# Patient Record
Sex: FEMALE | Race: WHITE | Employment: OTHER | URBAN - METROPOLITAN AREA
[De-identification: names, ages, dates, MRNs, and addresses within clinical notes are randomized per-mention and may not be internally consistent; named-entity substitution may affect disease eponyms.]

---

## 2017-03-22 ENCOUNTER — OFFICE VISIT (OUTPATIENT)
Dept: HEMATOLOGY | Age: 62
End: 2017-03-22

## 2017-03-22 VITALS
OXYGEN SATURATION: 96 % | SYSTOLIC BLOOD PRESSURE: 142 MMHG | TEMPERATURE: 97 F | WEIGHT: 93 LBS | BODY MASS INDEX: 16.48 KG/M2 | DIASTOLIC BLOOD PRESSURE: 95 MMHG | HEART RATE: 81 BPM | HEIGHT: 63 IN

## 2017-03-22 DIAGNOSIS — R74.8 ELEVATED LIVER ENZYMES: ICD-10-CM

## 2017-03-22 DIAGNOSIS — R74.8 ELEVATED LIVER ENZYMES: Primary | ICD-10-CM

## 2017-03-22 NOTE — MR AVS SNAPSHOT
Visit Information Date & Time Provider Department Dept. Phone Encounter #  
 3/22/2017  2:00 PM Nicko Johnson MD [facility] [place] of [address] [id number] Follow-up Instructions Return in about 4 weeks (around 4/19/2017) for MLS. Follow-up and Disposition History Your Appointments 4/26/2017 12:00 PM  
Follow Up with Nicko Johnson MD  
[address] [facility] ([facility]) Appt Note: Elevated LFT's  
 [address] [facility], [place], [facility] [address] [facility]  
  
   
 [address] [facility] Upcoming Health Maintenance Date Due Pneumococcal 19-64 Medium Risk (1 of 1 - PPSV23) 11/17/1974 DTaP/Tdap/Td series (1 - Tdap) 11/17/1976 PAP AKA CERVICAL CYTOLOGY 11/17/1976 BREAST CANCER SCRN MAMMOGRAM 11/17/2005 FOBT Q 1 YEAR AGE 50-75 11/17/2005 ZOSTER VACCINE AGE 60> 11/17/2015 INFLUENZA AGE 9 TO ADULT 8/1/2016 Allergies as of 3/22/2017  Review Complete On: 3/22/2017 By: Conchis Skelton Severity Noted Reaction Type Reactions Codeine  01/31/2014    Itching, Nausea and Vomiting Patient states she is not allergic to medication, \"it just doesn't agree with me. \" Morphine  01/31/2014    Nausea and Vomiting Patient states she is not allergic to medication, \"it just doesn't agree with me. \"  PATIENT DENIES INTOLERANCE TO MORPHINE Vicodin [Hydrocodone-acetaminophen]  01/31/2014    Nausea and Vomiting Patient states she is not allergic to medication, \"it just doesn't agree with me. \" Current Immunizations  Never Reviewed No immunizations on file. Not reviewed this visit You Were Diagnosed With   
  
 Codes Comments Elevated liver enzymes    -  Primary ICD-10-CM: R74.8 ICD-9-CM: 790.5 Vitals BP Pulse Temp Height(growth percentile) Weight(growth percentile) SpO2 Eduar Reyes ) 142/95 81 97 °F (36.1 °C) (Tympanic) 5' 2.5\" (1.588 m) 93 lb (42.2 kg) 96% BMI OB Status Smoking Status 16.74 kg/m2 Postmenopausal Current Some Day Smoker Vitals History BMI and BSA Data Body Mass Index Body Surface Area 16.74 kg/m 2 1.36 m 2 Your Updated Medication List  
  
   
This list is accurate as of: 3/22/17 11:59 PM.  Always use your most recent med list.  
  
  
  
  
 Inés Nino Take  by mouth. We Performed the Following CBC WITH AUTOMATED DIFF [09113 CPT(R)] HEPATIC FUNCTION PANEL [76421 CPT(R)] METABOLIC PANEL, BASIC [16794 CPT(R)] Follow-up Instructions Return in about 4 weeks (around 4/19/2017) for MLS. To-Do List   
 03/22/2017 Lab:  ACTIN (SMOOTH MUSCLE) ANTIBODY   
  
 03/22/2017 Lab:  ALPHA-1-ANTITRYPSIN, TOTAL   
  
 03/22/2017 Lab:  ANTI-NEUTROPHIL CYTOPLASMIC AB   
  
 03/22/2017 Lab:  ANTINUCLEAR ANTIBODIES, IFA   
  
 03/22/2017 Lab:  FERRITIN   
  
 03/22/2017 Lab:  IRON PROFILE   
  
 03/22/2017 Lab:  MITOCHONDRIAL M2 AB   
  
 03/25/2017 Imaging:  US ABD LTD W ELASTOGRAPHY Introducing [facility]! Romayne Duster introduces [facility] patient portal. Now you can access parts of your medical record, email your doctor's office, and request medication refills online. 1. In your internet browser, go to [email or web address] 2. Click on the First Time User? Click Here link in the Sign In box. You will see the New Member Sign Up page. 3. Enter your [facility] Access Code exactly as it appears below. You will not need to use this code after youve completed the sign-up process. If you do not sign up before the expiration date, you must request a new code. · [facility] Access Code: [email or web address] Expires: 6/20/2017  2:16 PM 
 
4.  Enter the last four digits of your Social Security Number (xxxx) and Date of Birth (mm/dd/yyyy) as indicated and click Submit. You will be taken to the next sign-up page. 5. Create a MobilePaks ID. This will be your MobilePaks login ID and cannot be changed, so think of one that is secure and easy to remember. 6. Create a MobilePaks password. You can change your password at any time. 7. Enter your Password Reset Question and Answer. This can be used at a later time if you forget your password. 8. Enter your e-mail address. You will receive e-mail notification when new information is available in [address] 9. Click Sign Up. You can now view and download portions of your medical record. 10. Click the Download Summary menu link to download a portable copy of your medical information. If you have questions, please visit the Frequently Asked Questions section of the MobilePaks website. Remember, MobilePaks is NOT to be used for urgent needs. For medical emergencies, dial 911. Now available from your iPhone and Android! Please provide this summary of care documentation to your next provider. Your primary care clinician is listed as Larry Layne. If you have any questions after today's visit, please call [phone].

## 2017-03-22 NOTE — PROGRESS NOTES
[address] MD, ANKITA Mckeon PA-C Sylvia Romance, MD, MD Adele Escalante, NP     Rizwana Keane, NP        [address] [facility], [address] [address]  22.     [phone]     FAX: [address] [place] [address], [address] [place] [address], [address] [facility] - [address]     [phone]     FAX: [phone]         Patient Care Team:  Tomy Boothe as PCP - General (Physician Assistant)      Problem List  Date Reviewed: 4/27/2014          Codes Class Noted    Fixation hardware in foot ICD-10-CM: Z96.7  ICD-9-CM: V45.89  2/18/2016        Elevated alkaline phosphatase level ICD-10-CM: R74.8  ICD-9-CM: 790.5  4/27/2014        Peroneal tenosynovitis (Chronic) ICD-10-CM: M65.9  ICD-9-CM: 727.09  2/4/2014        Valgus foot deformity, acquired (Chronic) ICD-10-CM: M21.079  ICD-9-CM: 736.79  2/4/2014        HCV antibody positive ICD-10-CM: R76.8  ICD-9-CM: 795.79  1/28/2014    Overview Signed 4/27/2014 10:13 AM by Serena Apple MD     HCV RNA undetectable             Chronic back pain ICD-10-CM: M54.9, G89.29  ICD-9-CM: 724.5, 338.29  1/28/2014              Magdalene Courser returns to the [address] for management of Elevated alkaline phosphatase. The active problem list, all pertinent past medical history, medications, endoscopic studies, radiologic findings and laboratory findings related to the liver disorder were reviewed with the patient. The patient is anti-HCV positive but HCV RNA undetectable. Ultrasound of the liver was performed in 2/2014. This suggests that the liver is normal.      Assessment of fibrosis by liver or elastography has not been performed.         The most recent laboratory studies indicate that the liver transaminases are normal, alkaline phosphatase is elevated, tests of hepatic synthetic and metabolic function are normal, and the platelet count is normal.      The patient notes fatigue, arthralgias, myalgias. The patient has limitations in functional activities secondary to these symptoms. The patient has not experienced problems concentrating, swelling of the abdomen, swelling of the lower extremities, hematemesis, hematochezia. ALLERGIES  Allergies   Allergen Reactions    Codeine Itching and Nausea and Vomiting     Patient states she is not allergic to medication, \"it just doesn't agree with me. \"    Morphine Nausea and Vomiting     Patient states she is not allergic to medication, \"it just doesn't agree with me. \"  PATIENT DENIES INTOLERANCE TO MORPHINE    Vicodin [Hydrocodone-Acetaminophen] Nausea and Vomiting     Patient states she is not allergic to medication, \"it just doesn't agree with me. \"       Current Outpatient Prescriptions   Medication Sig    ALPRAZOLAM (XANAX PO) Take  by mouth. No current facility-administered medications for this visit. SYSTEM REVIEW NOT RELATED TO LIVER DISEASE OR REVIEWED ABOVE:  Constitution systems: Negative for fever, chills, weight gain, weight loss. Eyes: Negative for visual changes. ENT: Negative for sore throat, painful swallowing. Respiratory: Negative for cough, hemoptysis, SOB. Cardiology: Negative for chest pain, palpitations. GI:  Negative for constipation or diarrhea. : Negative for urinary frequency, dysuria, hematuria, nocturia. Skin: Negative for rash. Hematology: Negative for easy bruising, blood clots. Musculo-skelatal: Negative for back pain, muscle pain, weakness. Neurologic: Negative for headaches, dizziness, vertigo, memory problems not related to HE. Psychology: Negative for anxiety, depression. FAMILY HISTORY  The father  of dementia. The mother  of peripheral vascular disease. A sister had HCV and has been treated and achieved SVR.     SOCIAL HISTORY:  The patient has never been . The patient has no children. The patient had used tobacco in the past.  The patient stopped using tobacco in 6/2013. The patient consumes alcohol only rarely. The patient used to work as  and . She is no longer working. PHYSICAL EXAMINATION:  Visit Vitals    BP (!) 142/95    Pulse 81    Temp 97 °F (36.1 °C) (Tympanic)    Ht 5' 2.5\" (1.588 m)    Wt 93 lb (42.2 kg)    SpO2 96%    BMI 16.74 kg/m2     General: No acute distress. Eyes: Sclera anicteric. ENT: No oral lesions. Thyroid normal.  Nodes: No adenopathy. Skin: No spider angiomata. No jaundice. No palmar erythema. Respiratory: Lungs clear to auscultation. Cardiovascular: Regular heart rate. No murmurs. No JVD. Abdomen: Soft non-tender. Liver size normal to percussion/palpation. Spleen not palpable. No obvious ascites. Extremities: No edema. No muscle wasting. No gross arthritic changes. Neurologic: Alert and oriented. Cranial nerves grossly intact. No asterixsis.     LABORATORY STUDIES:  Liver [facility] Ref Rng 3/27/2014 1/28/2014 1/20/2014   WBC 3.4 - 10.8 x10E3/uL  4.9 6.6   ANC 1.4 - 7.0 x10E3/uL  3.2 4.4   HGB 11.1 - 15.9 g/dL  12.2 12.9    - 379 x10E3/uL  227 234   INR 0.8 - 1.2  1.1    AST 0 - 40 IU/L 15 18 15   ALT 0 - 32 IU/L 9 15 17   Alk Phos 39 - 117 IU/L 118 (H) 110 142 (H)   Bili, Total 0.0 - 1.2 mg/dL 0.2 0.2 0.3   Bili, Direct 0.00 - 0.40 mg/dL 0.05 0.07    Albumin 3.5 - 5.5 g/dL 4.0 4.1 3.7   BUN 6 - 24 mg/dL  9 17   Creat 0.57 - 1.00 mg/dL  0.81 1.08   Na 134 - 144 mmol/L  142 138   K 3.5 - 5.2 mmol/L  5.1 3.7   Cl 97 - 108 mmol/L  100 99 (L)   CO2 19 - 28 mmol/L  27 31   Glucose 65 - 99 mg/dL  82 105 (H)       SEROLOGIES:  Serologies Latest Ref Rng 1/28/2014   Hep A Ab, Total Negative Positive (A)   Hep B Surface Ag Negative Negative   Hep B Core Ab, Total Negative Positive (A)   Hep C Genotype  CANCELED   Ferritin 15 - 150 ng/mL 110   Iron % Saturation 15 - 55 % 21     1/2014. HCV RNA undetected. 2/2014. HCV RNA undetectable. 3/2014. HCV RNA undetectable. LIVER HISTOLOGY:  Not available or performed    ENDOSCOPIC PROCEDURES:  Not available or performed    RADIOLOGY:  2/2014. Ultrasound of liver. Normal appearing liver. No liver mass lesions. OTHER TESTING:  Not available or performed    ASSESSMENT AND PLAN:  Anti-HCV with undetectable HCV RNA. Serum ALP is intermittently elevated. Liver transaminases are normal.  Liver function is normal.  The platelet count is normal.      The patient was exposed to HCV but resolved the infection spontaneously. Will repeat ALP and monitor this a few times over the next year. If this now remains persistently normal then no further evaluation is needed. If ALP remains elevated will need MRCP, serology and possibly liver biopsy. The need to assess liver fibrosis was discussed. Sheer wave elastography can assess liver fibrosis and determine if a patient has advanced fibrosis or cirrhosis without the need for liver biopsy. Sheer wave elastography is now available at [facility]. This will be scheduled. Vaccination for viral hepatitis A and B is not needed. The patient has serologic evidence of prior exposure or vaccination with immunity. [facility] 87 in 6 months.     Kris Meza MD  [facility] [place] of [address], [address] [facility], [address]  [phone]

## 2017-03-23 ENCOUNTER — HOSPITAL ENCOUNTER (OUTPATIENT)
Dept: LAB | Age: 62
Discharge: HOME | End: 2017-03-23

## 2017-03-28 ENCOUNTER — HOSPITAL ENCOUNTER (OUTPATIENT)
Dept: LAB | Age: 62
Discharge: HOME | End: 2017-03-28

## 2017-03-28 ENCOUNTER — HOSPITAL ENCOUNTER (OUTPATIENT)
Dept: GENERAL RADIOLOGY | Age: 62
Discharge: HOME | End: 2017-03-28
Payer: MEDICAID

## 2017-03-28 DIAGNOSIS — R63.4 ABNORMAL WEIGHT LOSS: ICD-10-CM

## 2017-03-28 PROCEDURE — 71020 XR CHEST PA LAT: CPT

## 2017-03-28 PROCEDURE — 99001 SPECIMEN HANDLING PT-LAB: CPT | Performed by: INTERNAL MEDICINE

## 2017-03-30 LAB
A1AT SERPL-MCNC: 214 MG/DL (ref 90–200)
ACTIN IGG SERPL-ACNC: 9 UNITS (ref 0–19)
ANA TITR SER IF: NEGATIVE {TITER}
C-ANCA TITR SER IF: NORMAL TITER
FERRITIN SERPL-MCNC: 84 NG/ML (ref 15–150)
IRON SATN MFR SERPL: 15 % (ref 15–55)
IRON SERPL-MCNC: 48 UG/DL (ref 27–139)
MITOCHONDRIA M2 IGG SER-ACNC: 3.7 UNITS (ref 0–20)
P-ANCA ATYPICAL TITR SER IF: NORMAL TITER
P-ANCA TITR SER IF: NORMAL TITER
TIBC SERPL-MCNC: 313 UG/DL (ref 250–450)
UIBC SERPL-MCNC: 265 UG/DL (ref 118–369)

## 2017-07-17 ENCOUNTER — HOSPITAL ENCOUNTER (EMERGENCY)
Age: 62
Discharge: HOME | End: 2017-07-17
Attending: EMERGENCY MEDICINE
Payer: MEDICAID

## 2017-07-17 VITALS
HEIGHT: 63 IN | BODY MASS INDEX: 18.07 KG/M2 | DIASTOLIC BLOOD PRESSURE: 89 MMHG | HEART RATE: 81 BPM | SYSTOLIC BLOOD PRESSURE: 165 MMHG | WEIGHT: 102 LBS | OXYGEN SATURATION: 100 % | RESPIRATION RATE: 20 BRPM | TEMPERATURE: 98.2 F

## 2017-07-17 DIAGNOSIS — H65.93 MIDDLE EAR EFFUSION, BILATERAL: Primary | ICD-10-CM

## 2017-07-17 PROCEDURE — 99282 EMERGENCY DEPT VISIT SF MDM: CPT

## 2017-07-17 RX ORDER — LORATADINE 10 MG/1
10 TABLET ORAL DAILY
Qty: 20 TAB | Refills: 0 | Status: SHIPPED | OUTPATIENT
Start: 2017-07-17

## 2017-07-17 RX ORDER — IBUPROFEN 600 MG/1
600 TABLET ORAL
Qty: 20 TAB | Refills: 0 | Status: SHIPPED | OUTPATIENT
Start: 2017-07-17

## 2017-07-17 RX ORDER — FLUTICASONE PROPIONATE 50 MCG
2 SPRAY, SUSPENSION (ML) NASAL DAILY
Qty: 1 BOTTLE | Refills: 0 | Status: SHIPPED | OUTPATIENT
Start: 2017-07-17

## 2017-07-17 NOTE — ED TRIAGE NOTES
Patient with complaints of having swimmers ear in bilateral ears. Patient also states that she is here for a refill of her Xanax. Patient does not see her PCP until the 3rd of August. Upon checking in patient stated that it was too cold to wait in the lobby and waited in the car.

## 2017-07-17 NOTE — ED PROVIDER NOTES
HPI Comments: 10:26 AM     Marie Santillan is a 64 y.o. female presenting to the ED C/O bilateral ear pain. Pt voices concern for swimmer's ear which was diagnosed by her PCP 1 month ago. She was rx'd with antibiotics which she has taken with some relief. Associated sxs include sensitive hearing and congestion. Pt is also requesting a refill for her Xanax; her next appointment with her PCP is in 2 weeks. Reports cigarette use cessation 5 months ago. Denies hx of HTN. Pt denies fever, ear drainage, sore throat, and any other symptoms or complaints. Written by MIRIAM Grimaldo, as dictated by Brenda Harman PA-C      Patient is a 64 y.o. female presenting with ear pain. The history is provided by the patient. No  was used. Ear Pain   This is a new problem. The current episode started more than 1 week ago (1 month ago). The problem occurs constantly. Treatments tried: antibiotics. The treatment provided moderate relief. Past Medical History:   Diagnosis Date    Ankle fracture 2008    Anxiety     Asthma     Copd?  Chronic pain     on methadone and heavy narcotics    Hepatitis     c    High cholesterol     Lumbar back pain        Past Surgical History:   Procedure Laterality Date    HX ORTHOPAEDIC  1977    back surgery    HX ORTHOPAEDIC  2012    FOOT SX         History reviewed. No pertinent family history. Social History     Social History    Marital status: SINGLE     Spouse name: N/A    Number of children: N/A    Years of education: N/A     Occupational History    Not on file.      Social History Main Topics    Smoking status: Current Some Day Smoker    Smokeless tobacco: Never Used    Alcohol use 4.2 oz/week     7 Glasses of wine per week      Comment: occasionally    Drug use: No    Sexual activity: No     Other Topics Concern    Not on file     Social History Narrative         ALLERGIES: Codeine; Morphine; and Vicodin [hydrocodone-acetaminophen]    Review of Systems   Constitutional: Negative for fever. HENT: Positive for congestion and ear pain (bilateral). Negative for ear discharge and sore throat. All other systems reviewed and are negative. Vitals:    07/17/17 1019   BP: 165/89   Pulse: 81   Resp: 20   Temp: 98.2 °F (36.8 °C)   SpO2: 100%   Weight: 46.3 kg (102 lb)   Height: 5' 3\" (1.6 m)            Physical Exam   Constitutional: She is oriented to person, place, and time. She appears well-developed and well-nourished. No distress. Alert, well appearing, non toxic, speaking in full sentences without difficulty    HENT:   Head: Normocephalic and atraumatic. Right Ear: External ear and ear canal normal. No drainage, swelling or tenderness. Tympanic membrane is not perforated, not erythematous, not retracted and not bulging. A middle ear effusion is present. Left Ear: External ear and ear canal normal. No drainage, swelling or tenderness. Tympanic membrane is not perforated, not erythematous, not retracted and not bulging. A middle ear effusion is present. Nose: Nose normal. No mucosal edema or rhinorrhea. Right sinus exhibits no maxillary sinus tenderness and no frontal sinus tenderness. Left sinus exhibits no maxillary sinus tenderness and no frontal sinus tenderness. Mouth/Throat: Uvula is midline, oropharynx is clear and moist and mucous membranes are normal. Mucous membranes are not dry. No uvula swelling. No oropharyngeal exudate, posterior oropharyngeal edema, posterior oropharyngeal erythema or tonsillar abscesses. Neck: Normal range of motion. Neck supple. Cardiovascular: Normal rate, regular rhythm, normal heart sounds and intact distal pulses. No murmur heard. Pulmonary/Chest: Effort normal and breath sounds normal. No respiratory distress. She has no wheezes. She has no rales. Lymphadenopathy:     She has no cervical adenopathy.    Neurological: She is alert and oriented to person, place, and time. Skin: Skin is warm and dry. No rash noted. Psychiatric: She has a normal mood and affect. Judgment normal.   Nursing note and vitals reviewed. RESULTS:    PULSE OXIMETRY NOTE:  Pulse-ox is 100% on room air  Interpretation: normal       No orders to display        Labs Reviewed - No data to display    No results found for this or any previous visit (from the past 12 hour(s)). MDM  Number of Diagnoses or Management Options  Medication refill:   Middle ear effusion, bilateral:     ED Course     MEDICATIONS GIVEN:  Medications - No data to display     Procedures    PROGRESS NOTE:  10:26 AM  Initial assessment performed. Written by Frederic De Los Santos ED Scribe, as dictated by Delmar Antonio PA-C    DISCHARGE NOTE:  10:36 AM   Ana Fischer's  results have been reviewed with her. She has been counseled regarding her diagnosis, treatment, and plan. She verbally conveys understanding and agreement of the signs, symptoms, diagnosis, treatment and prognosis and additionally agrees to follow up as discussed. She also agrees with the care-plan and conveys that all of her questions have been answered. I have also provided discharge instructions for her that include: educational information regarding their diagnosis and treatment, and list of reasons why they would want to return to the ED prior to their follow-up appointment, should her condition change. The patient and/or family has been provided with education for proper Emergency Department utilization. CLINICAL IMPRESSION:    1.  Middle ear effusion, bilateral        PLAN: DISCHARGE HOME    Follow-up Information     Follow up With Details Comments [address] [place], [place] Go in 2 weeks For your appointment as scheduled, and , As needed Romeo  [phone]      [facility] EMERGENCY DEPT  As needed, If symptoms worsen [address] [facility] [address] [phone]          Discharge Medication List as of 7/17/2017 10:39 AM      START taking these medications    Details   fluticasone (FLONASE) 50 mcg/actuation nasal spray 2 Sprays by Both Nostrils route daily. , Normal, Disp-1 Bottle, R-0      loratadine (CLARITIN) 10 mg tablet Take 1 Tab by mouth daily. , Normal, Disp-20 Tab, R-0      ibuprofen (MOTRIN) 600 mg tablet Take 1 Tab by mouth every six (6) hours as needed for Pain., Normal, Disp-20 Tab, R-0         CONTINUE these medications which have NOT CHANGED    Details   ALPRAZOLAM (XANAX PO) Take  by mouth., Historical Med             ATTESTATIONS:  This note is prepared by Mirlande Pleitez, acting as Scribe for Juan Carlos Stoll PA-C. Juan Carlos Stoll PA-C: The scribe's documentation has been prepared under my direction and personally reviewed by me in its entirety. I confirm that the note above accurately reflects all work, treatment, procedures, and medical decision making performed by me.

## 2017-07-17 NOTE — DISCHARGE INSTRUCTIONS
Middle Ear Fluid: Care Instructions  Your Care Instructions    Fluid often builds up inside the ear during a cold or allergies. Usually the fluid drains away, but sometimes a small tube in the ear, called the eustachian tube, stays blocked for months. Symptoms of fluid buildup may include:  · Popping, ringing, or a feeling of fullness or pressure in the ear. · Trouble hearing. · Balance problems and dizziness. In most cases, you can treat yourself at home. Follow-up care is a key part of your treatment and safety. Be sure to make and go to all appointments, and call your doctor if you are having problems. It's also a good idea to know your test results and keep a list of the medicines you take. How can you care for yourself at home? · In most cases, the fluid clears up within a few months without treatment. You may need more tests if the fluid does not clear up after 3 months. · If your doctor prescribed antibiotics, take them as directed. Do not stop taking them just because you feel better. You need to take the full course of antibiotics. When should you call for help? Call your doctor now or seek immediate medical care if:  · You have symptoms of infection, such as:  ¨ Increased pain, swelling, warmth, or redness. ¨ Pus draining from the area. ¨ A fever. Watch closely for changes in your health, and be sure to contact your doctor if:  · You notice changes in hearing. · You do not get better as expected. Where can you learn more? Go to http://olivia-sheila.info/. Enter F224 in the search box to learn more about \"Middle Ear Fluid: Care Instructions. \"  Current as of: July 29, 2016  Content Version: 11.3  © 4795-5477 [employer]. Care instructions adapted under license by [employer] (which disclaims liability or warranty for this information).  If you have questions about a medical condition or this instruction, always ask your healthcare professional. [employer] disclaims any warranty or liability for your use of this information.

## 2018-11-26 ENCOUNTER — OFFICE VISIT (OUTPATIENT)
Dept: HEMATOLOGY | Age: 63
End: 2018-11-26

## 2018-11-26 VITALS
TEMPERATURE: 97 F | HEIGHT: 63 IN | WEIGHT: 104.13 LBS | SYSTOLIC BLOOD PRESSURE: 114 MMHG | DIASTOLIC BLOOD PRESSURE: 69 MMHG | HEART RATE: 78 BPM | OXYGEN SATURATION: 93 % | BODY MASS INDEX: 18.45 KG/M2

## 2018-11-26 DIAGNOSIS — R74.8 ELEVATED ALKALINE PHOSPHATASE LEVEL: Primary | ICD-10-CM

## 2018-11-26 DIAGNOSIS — R74.8 ELEVATED ALKALINE PHOSPHATASE LEVEL: ICD-10-CM

## 2018-11-26 NOTE — PROGRESS NOTES
245 [facility] [address], [place], [address], [place], [place]       Dick Resendiz, SHARMILA Arndt, [facility]   ANKITA Tate NP [facility] 136    at [address], [facility] [place] Stevie Thibodeaux  22.    [phone]    FAX: [address] [place] [address]    at [address], [address]    [phone] 3531 [facility] Street: [phone]       Patient Care Team:  Tomy Snow as PCP - General (Physician Assistant)      Problem List  Date Reviewed: 3/25/2017          Codes Class Noted    Fixation hardware in foot ICD-10-CM: Z96.7  ICD-9-CM: V45.89  2/18/2016        Elevated alkaline phosphatase level ICD-10-CM: R74.8  ICD-9-CM: 790.5  4/27/2014        Peroneal tenosynovitis (Chronic) ICD-10-CM: M65.9  ICD-9-CM: 727.09  2/4/2014        Valgus foot deformity, acquired (Chronic) ICD-10-CM: M21.079  ICD-9-CM: 736.79  2/4/2014        HCV antibody positive ICD-10-CM: R76.8  ICD-9-CM: 795.79  1/28/2014    Overview Signed 4/27/2014 10:13 AM by Rox Barrera MD     HCV RNA undetectable             Chronic back pain ICD-10-CM: M54.9, G89.29  ICD-9-CM: 724.5, 338.29  1/28/2014              [facility]arn[facility]ab[facility] of Elevated alkaline phosphatase. The active problem list, all pertinent past medical history, medications, endoscopic studies, radiologic findings and laboratory findings related to the liver disorder were reviewed with the patient. The patient is anti-HCV positive but HCV RNA undetectable. Ultrasound of the liver was performed in 2/2014. This suggests that the liver is normal.      Assessment of fibrosis by liver or elastography has not been performed.         The patient notes fatigue, arthralgias, myalgias. The patient has limitations in functional activities secondary to these symptoms. The patient has not experienced problems concentrating, swelling of the abdomen, swelling of the lower extremities, hematemesis, hematochezia. ASSESSMENT AND PLAN:  Elevated liver enzymes  Persistent elevation in alkaline phosphatase of unclear etiology at this time. Liver transaminases are normal.  ALP is elevated. Liver function is normal.  The platelet count is normal.    Serologic testing for causes of chronic liver disease were negative     Will perform imaging of the liver with ultrasound. The need to assess liver fibrosis was discussed. Sheer wave elastography can assess liver fibrosis and determine if a patient has advanced fibrosis or cirrhosis without the need for liver biopsy. Sheer wave elastography is now available at [facility]. This will be scheduled. If elastrography suggests advanced fibrosis then a liver biopsy should be considered. Elastography can be repeated annually to assess for fibrosis progression and need for treatment of the liver disorder. The most likely cause for persistent elevation in ALP is medications. However sicne the ALP is stable and no other liver chemistries are abnormal no changes to medications needs to be made at this time. Treatment of other medical problems in patients with chronic liver disease  There are no contraindications for the patient to take any medications that are necessary for treatment of other medical issues. The patient does not consume alcohol daily. Normal doses of acetaminophen can be used for pain as needed. Normal doses of acetaminophen as recommended on the label are not hepatotoxic, even in patients with cirrhosis.     Counseling for alcohol in patients with chronic liver disease  The patient was counseled regarding alcohol consumption and the effect of alcohol on chronic liver disease. The patient does not consume any significant amount of alcohol. Osteoporosis  The patient has osteoporosis by DEXA scan from 2014. She is not taking a bisphosphonate. The patient should be started on a bisphosphonate. The elevation in ALP is not a contraindication to this. Vaccinations   Vaccination for viral hepatitis A and B is not needed. The patient has serologic evidence of prior exposure or vaccination with immunity. Routine vaccinations against other bacterial and viral agents can be performed as indicated. Annual flu vaccination should be administered if indicated. ALLERGIES  Allergies   Allergen Reactions    Codeine Itching and Nausea and Vomiting     Patient states she is not allergic to medication, \"it just doesn't agree with me. \"    Morphine Nausea and Vomiting     Patient states she is not allergic to medication, \"it just doesn't agree with me. \"  PATIENT DENIES INTOLERANCE TO MORPHINE    Vicodin [Hydrocodone-Acetaminophen] Nausea and Vomiting     Patient states she is not allergic to medication, \"it just doesn't agree with me. \"       Current Outpatient Medications   Medication Sig    fluticasone (FLONASE) 50 mcg/actuation nasal spray 2 Sprays by Both Nostrils route daily.  loratadine (CLARITIN) 10 mg tablet Take 1 Tab by mouth daily.  ibuprofen (MOTRIN) 600 mg tablet Take 1 Tab by mouth every six (6) hours as needed for Pain.  ALPRAZOLAM (XANAX PO) Take  by mouth. No current facility-administered medications for this visit. SYSTEM REVIEW NOT RELATED TO LIVER DISEASE OR REVIEWED ABOVE:  Constitution systems: Negative for fever, chills, weight gain, weight loss. Eyes: Negative for visual changes. ENT: Negative for sore throat, painful swallowing. Respiratory: Negative for cough, hemoptysis, SOB. Cardiology: Negative for chest pain, palpitations. GI:  Negative for constipation or diarrhea.   : Negative for urinary frequency, dysuria, hematuria, nocturia. Skin: Negative for rash. Hematology: Negative for easy bruising, blood clots. Musculo-skelatal: Negative for back pain, muscle pain, weakness. Neurologic: Negative for headaches, dizziness, vertigo, memory problems not related to HE. Psychology: Negative for anxiety, depression. FAMILY HISTORY  The father  of dementia. The mother  of peripheral vascular disease. A sister had HCV and has been treated and achieved SVR. SOCIAL HISTORY:  The patient has never been . The patient has no children. The patient had used tobacco in the past.  The patient stopped using tobacco in 2013. The patient consumes alcohol only rarely. The patient used to work as  and . She is no longer working. PHYSICAL EXAMINATION:  Visit Vitals  /69   Pulse 78   Temp 97 °F (36.1 °C) (Tympanic)   Ht 5' 3\" (1.6 m)   Wt 104 lb 2 oz (47.2 kg)   SpO2 93%   BMI 18.44 kg/m²     General: No acute distress. Eyes: Sclera anicteric. ENT: No oral lesions. Thyroid normal.  Nodes: No adenopathy. Skin: No spider angiomata. No jaundice. No palmar erythema. Respiratory: Lungs clear to auscultation. Cardiovascular: Regular heart rate. No murmurs. No JVD. Abdomen: Soft non-tender. Liver size normal to percussion/palpation. Spleen not palpable. No obvious ascites. Extremities: No edema. No muscle wasting. No gross arthritic changes. Neurologic: Alert and oriented. Cranial nerves grossly intact. No asterixsis.     LABORATORY STUDIES:  [facility] Ref Rng 3/27/2014 2014 2014   WBC 3.4 - 10.8 x10E3/uL  4.9 6.6   ANC 1.4 - 7.0 x10E3/uL  3.2 4.4   HGB 11.1 - 15.9 g/dL  12.2 12.9    - 379 x10E3/uL  227 234   INR 0.8 - 1.2  1.1    AST 0 - 40 IU/L 15 18 15   ALT 0 - 32 IU/L 9 15 17   Alk Phos 39 - 117 IU/L 118 (H) 110 142 (H)   Bili, Total 0.0 - 1.2 mg/dL 0.2 0.2 0.3   Bili, Direct 0.00 - 0.40 mg/dL 0.05 0.07    Albumin 3.5 - 5.5 g/dL 4.0 4.1 3.7   BUN 6 - 24 mg/dL  9 17   Creat 0.57 - 1.00 mg/dL  0.81 1.08   Na 134 - 144 mmol/L  142 138   K 3.5 - 5.2 mmol/L  5.1 3.7   Cl 97 - 108 mmol/L  100 99 (L)   CO2 19 - 28 mmol/L  27 31   Glucose 65 - 99 mg/dL  82 105 (H)       SEROLOGIES:  Serologies Latest Ref Rng 2014   Hep A Ab, Total Negative Positive (A)   Hep B Surface Ag Negative Negative   Hep B Core Ab, Total Negative Positive (A)   Hep C Genotype  CANCELED   Ferritin 15 - 150 ng/mL 110   Iron % Saturation 15 - 55 % 21     2014. HCV RNA undetected. 2014. HCV RNA undetectable. 3/2014. HCV RNA undetectable. Serologies Latest Ref Rng & Units 3/28/2017   Ferritin 15 - 150 ng/mL 84   Iron % Saturation 15 - 55 % 15   MARY, IFA  Negative   C-ANCA Neg:<1:20 titer <1:20   P-ANCA Neg:<1:20 titer <1:20   ANCA Neg:<1:20 titer <1:20   ASMCA 0 - 19 Units 9   M2 Ab 0.0 - 20.0 Units 3.7   Alpha-1 antitrypsin level 90 - 200 mg/dL 214 (H)     LIVER HISTOLOGY:  Not available or performed    ENDOSCOPIC PROCEDURES:  Not available or performed    RADIOLOGY:  2014. Ultrasound of liver. Normal appearing liver. No liver mass lesions. OTHER TESTIN2014. DEXA bone scan. Osteoporosis    FOLLOW-UP:  All of the issues listed above in the Assessment and Plan were discussed with the patient. All questions were answered. The patient expressed a clear understanding of the above. 1901 [facility] 87 in 4 weeks for elastography to review all data and determine the treatment plan.       Betty Norris [place]  [address]  4 [facility], [address] [place] [address] Cheyenne Garcia, [address]  12 [facility]

## 2018-12-21 ENCOUNTER — HOSPITAL ENCOUNTER (OUTPATIENT)
Dept: ULTRASOUND IMAGING | Age: 63
Discharge: HOME | End: 2018-12-21
Attending: INTERNAL MEDICINE
Payer: MEDICAID

## 2018-12-21 DIAGNOSIS — R74.8 ELEVATED ALKALINE PHOSPHATASE LEVEL: ICD-10-CM

## 2018-12-21 PROCEDURE — 0346T US ABD LTD W ELASTOGRAPHY: CPT

## 2018-12-26 ENCOUNTER — HOSPITAL ENCOUNTER (OUTPATIENT)
Dept: LAB | Age: 63
Discharge: HOME | End: 2018-12-26

## 2018-12-26 ENCOUNTER — OFFICE VISIT (OUTPATIENT)
Dept: HEMATOLOGY | Age: 63
End: 2018-12-26

## 2018-12-26 VITALS
HEIGHT: 63 IN | BODY MASS INDEX: 18.36 KG/M2 | SYSTOLIC BLOOD PRESSURE: 118 MMHG | DIASTOLIC BLOOD PRESSURE: 93 MMHG | RESPIRATION RATE: 18 BRPM | OXYGEN SATURATION: 97 % | WEIGHT: 103.6 LBS | HEART RATE: 87 BPM | TEMPERATURE: 96.8 F

## 2018-12-26 DIAGNOSIS — R74.8 ELEVATED ALKALINE PHOSPHATASE LEVEL: Primary | ICD-10-CM

## 2018-12-26 LAB — XX-LABCORP SPECIMEN COL,LCBCF: NORMAL

## 2018-12-26 PROCEDURE — 99001 SPECIMEN HANDLING PT-LAB: CPT

## 2018-12-26 NOTE — PROGRESS NOTES
245 [facility] [address], [place], [address] [address], [place], [place]       Dominic Noel, ANKITA Coyne, SHARMILA Yan, [facility]   Monroe Lee, ANKITA Worrell, ANKITA Garcia [facility] 136    at [address], [address] Stevie Thibodeaux  22.    [phone]    FAX: [address] [place] [address]    at [address], [id number] [place] [address], [address]    [phone]    FAX: [phone]     Patient Care Team:  Benito Carter MD as PCP - General (Family Practice)    Problem List  Date Reviewed: 1/2/2019          Codes Class Noted    Fixation hardware in foot ICD-10-CM: Z96.7  ICD-9-CM: V45.89  2/18/2016        Elevated alkaline phosphatase level ICD-10-CM: R74.8  ICD-9-CM: 790.5  4/27/2014        Peroneal tenosynovitis (Chronic) ICD-10-CM: M65.9  ICD-9-CM: 727.09  2/4/2014        Valgus foot deformity, acquired (Chronic) ICD-10-CM: M21.079  ICD-9-CM: 736.79  2/4/2014        HCV antibody positive ICD-10-CM: R76.8  ICD-9-CM: 795.79  1/28/2014    Overview Signed 4/27/2014 10:13 AM by Ling Ramirez MD     HCV RNA undetectable             Chronic back pain ICD-10-CM: M54.9, G89.29  ICD-9-CM: 724.5, 338.29  1/28/2014              Dawn[facility]root [facility] of Elevated alkaline phosphatase. The active problem list, all pertinent past medical history, medications, endoscopic studies, radiologic findings and laboratory findings related to the liver disorder were reviewed with the patient. The patient is anti-HCV positive but HCV RNA undetectable. Ultrasound of the liver was performed in 12/2018. This suggests chronic liver disease/cirrhosis. Assessment of liver fibrosis was performed with sheer wave elastography at THE [facility] in 12/2018.  The result was E Mean: 6.52 kPa which correlates with normal to mild fibrosis. The patient notes fatigue, arthralgias, myalgias. The patient has not experienced problems concentrating, swelling of the abdomen, swelling of the lower extremities, hematemesis, hematochezia. The patient has limitations in functional activities secondary to these symptoms. ASSESSMENT AND PLAN:  Elevated liver enzymes  Persistent elevation in alkaline phosphatase of unclear etiology at this time. Liver transaminases are normal.  ALP is elevated. Liver function is normal.  The platelet count is normal.    Serologic testing for causes of chronic liver disease were negative     Will perform imaging of the liver with ultrasound. The need to assess liver fibrosis was discussed. Sheer wave elastography can assess liver fibrosis and determine if a patient has advanced fibrosis or cirrhosis without the need for liver biopsy. Sheer wave elastography is now available at [facility]. This will be scheduled. If elastrography suggests advanced fibrosis then a liver biopsy should be considered. Elastography can be repeated annually to assess for fibrosis progression and need for treatment of the liver disorder. The most likely cause for persistent elevation in ALP is medications. However sicne the ALP is stable and no other liver chemistries are abnormal no changes to medications needs to be made at this time. Treatment of other medical problems in patients with chronic liver disease  There are no contraindications for the patient to take any medications that are necessary for treatment of other medical issues. The patient does not consume alcohol daily. Normal doses of acetaminophen can be used for pain as needed. Normal doses of acetaminophen as recommended on the label are not hepatotoxic, even in patients with cirrhosis.     Counseling for alcohol in patients with chronic liver disease  The patient was counseled regarding alcohol consumption and the effect of alcohol on chronic liver disease. The patient does not consume any significant amount of alcohol. Osteoporosis  The patient has osteoporosis by DEXA scan from 2014. She is not taking a bisphosphonate. The patient should be started on a bisphosphonate. The elevation in ALP is not a contraindication to this. Vaccinations   Vaccination for viral hepatitis A and B is not needed. The patient has serologic evidence of prior exposure or vaccination with immunity. Routine vaccinations against other bacterial and viral agents can be performed as indicated. Annual flu vaccination should be administered if indicated. ALLERGIES  Allergies   Allergen Reactions    Codeine Itching and Nausea and Vomiting     Patient states she is not allergic to medication, \"it just doesn't agree with me. \"    Morphine Nausea and Vomiting     Patient states she is not allergic to medication, \"it just doesn't agree with me. \"  PATIENT DENIES INTOLERANCE TO MORPHINE    Vicodin [Hydrocodone-Acetaminophen] Nausea and Vomiting     Patient states she is not allergic to medication, \"it just doesn't agree with me. \"       Current Outpatient Medications   Medication Sig    levothyroxine sodium (SYNTHROID PO) Take  by mouth.  ALPRAZOLAM (XANAX PO) Take  by mouth.  fluticasone (FLONASE) 50 mcg/actuation nasal spray 2 Sprays by Both Nostrils route daily.  loratadine (CLARITIN) 10 mg tablet Take 1 Tab by mouth daily.  ibuprofen (MOTRIN) 600 mg tablet Take 1 Tab by mouth every six (6) hours as needed for Pain. No current facility-administered medications for this visit. SYSTEM REVIEW NOT RELATED TO LIVER DISEASE OR REVIEWED ABOVE:  Constitution systems: Negative for fever, chills, weight gain, weight loss. Eyes: Negative for visual changes. ENT: Negative for sore throat, painful swallowing. Respiratory: Negative for cough, hemoptysis, SOB.    Cardiology: Negative for chest pain, palpitations. GI:  Negative for constipation or diarrhea. : Negative for urinary frequency, dysuria, hematuria, nocturia. Skin: Negative for rash. Hematology: Negative for easy bruising, blood clots. Musculo-skelatal: Negative for back pain, muscle pain, weakness. Neurologic: Negative for headaches, dizziness, vertigo, memory problems not related to HE. Psychology: Negative for anxiety, depression. FAMILY HISTORY  The father  of dementia. The mother  of peripheral vascular disease. A sister had HCV and has been treated and achieved SVR. SOCIAL HISTORY:  The patient has never been . The patient has no children. The patient had used tobacco in the past.  The patient stopped using tobacco in 2013. The patient consumes alcohol only rarely. The patient used to work as  and . She is no longer working. PHYSICAL EXAMINATION:  Visit Vitals  BP (!) 118/93 (BP 1 Location: Left arm, BP Patient Position: Sitting)   Pulse 87   Temp 96.8 °F (36 °C) (Tympanic)   Resp 18   Ht 5' 3\" (1.6 m)   Wt 103 lb 9.6 oz (47 kg)   SpO2 97%   BMI 18.35 kg/m²     General: No acute distress. Eyes: Sclera anicteric. ENT: No oral lesions. Thyroid normal.  Nodes: No adenopathy. Skin: No spider angiomata. No jaundice. No palmar erythema. Respiratory: Lungs clear to auscultation. Cardiovascular: Regular heart rate. No murmurs. No JVD. Abdomen: Soft non-tender. Liver size normal to percussion/palpation. Spleen not palpable. No obvious ascites. Extremities: No edema. No muscle wasting. No gross arthritic changes. Neurologic: Alert and oriented. Cranial nerves grossly intact. No asterixsis.     LABORATORY STUDIES:  Liver [place] of [address] Units 2018   WBC 3.4 - 10.8 x10E3/uL 6.4   ANC 1.4 - 7.0 x10E3/uL 4.6   HGB 11.1 - 15.9 g/dL 14.1    - 379 x10E3/uL 304   INR 0.8 - 1.2 1.0   AST 0 - 40 IU/L 23   ALT 0 - 32 IU/L 16   Alk Phos 39 - 117 IU/L 147 (H)   Bili, Total 0.0 - 1.2 mg/dL 0.3   Bili, Direct 0.00 - 0.40 mg/dL 0.08   Albumin 3.6 - 4.8 g/dL 4.7   BUN 8 - 27 mg/dL 9   Creat 0.57 - 1.00 mg/dL 0.90   Na 134 - 144 mmol/L 142   K 3.5 - 5.2 mmol/L 5.8 (H)   Cl 96 - 106 mmol/L 101   CO2 20 - 29 mmol/L 27   Glucose 65 - 99 mg/dL 94       SEROLOGIES:  Serologies Latest Ref Rng 2014   Hep A Ab, Total Negative Positive (A)   Hep B Surface Ag Negative Negative   Hep B Core Ab, Total Negative Positive (A)   Hep C Genotype  CANCELED   Ferritin 15 - 150 ng/mL 110   Iron % Saturation 15 - 55 % 21     2014. HCV RNA undetected. 2014. HCV RNA undetectable. 3/2014. HCV RNA undetectable. Serologies Latest Ref Rng & Units 3/28/2017   Ferritin 15 - 150 ng/mL 84   Iron % Saturation 15 - 55 % 15   MARY, IFA  Negative   C-ANCA Neg:<1:20 titer <1:20   P-ANCA Neg:<1:20 titer <1:20   ANCA Neg:<1:20 titer <1:20   ASMCA 0 - 19 Units 9   M2 Ab 0.0 - 20.0 Units 3.7   Alpha-1 antitrypsin level 90 - 200 mg/dL 214 (H)     LIVER HISTOLOGY:  2018. Sheer wave elastography performed at [facility]. EkPa was E Range: 4.76-10.10 kPa, E Mean: 6.52 kPa, E Median: 6.50 kPa, E Std: 1.63 kPa. The results suggested a fibrosis level of F1.    ENDOSCOPIC PROCEDURES:  Not available or performed    RADIOLOGY:  2014. Ultrasound of liver. Normal appearing liver. No liver mass lesions. 2018. Ultrasound of liver. Echogenic consistent with chronic liver disease. No liver mass lesions. No dilated bile ducts. No ascites. OTHER TESTIN2014. DEXA bone scan. Osteoporosis    FOLLOW-UP:  All of the issues listed above in the Assessment and Plan were discussed with the patient. All questions were answered. The patient expressed a clear understanding of the above. 1901 [facility] 87 in 6 months for routine monitoring.      Red Almendarez, ROMAINE-BC  [address] Liver [facility]  [address] [facility], [address]  98 [place] tSacey Tucker, [address]  [phone]

## 2018-12-27 LAB
ALBUMIN SERPL-MCNC: 4.7 G/DL (ref 3.6–4.8)
ALP SERPL-CCNC: 147 IU/L (ref 39–117)
ALT SERPL-CCNC: 16 IU/L (ref 0–32)
AST SERPL-CCNC: 23 IU/L (ref 0–40)
BASOPHILS # BLD AUTO: 0 X10E3/UL (ref 0–0.2)
BASOPHILS NFR BLD AUTO: 1 %
BILIRUB DIRECT SERPL-MCNC: 0.08 MG/DL (ref 0–0.4)
BILIRUB SERPL-MCNC: 0.3 MG/DL (ref 0–1.2)
BUN SERPL-MCNC: 9 MG/DL (ref 8–27)
BUN/CREAT SERPL: 10 (ref 12–28)
CALCIUM SERPL-MCNC: 10.6 MG/DL (ref 8.7–10.3)
CHLORIDE SERPL-SCNC: 101 MMOL/L (ref 96–106)
CO2 SERPL-SCNC: 27 MMOL/L (ref 20–29)
CREAT SERPL-MCNC: 0.9 MG/DL (ref 0.57–1)
EOSINOPHIL # BLD AUTO: 0.2 X10E3/UL (ref 0–0.4)
EOSINOPHIL NFR BLD AUTO: 3 %
ERYTHROCYTE [DISTWIDTH] IN BLOOD BY AUTOMATED COUNT: 16.1 % (ref 12.3–15.4)
GLUCOSE SERPL-MCNC: 94 MG/DL (ref 65–99)
HCT VFR BLD AUTO: 43.2 % (ref 34–46.6)
HGB BLD-MCNC: 14.1 G/DL (ref 11.1–15.9)
IMM GRANULOCYTES # BLD: 0 X10E3/UL (ref 0–0.1)
IMM GRANULOCYTES NFR BLD: 0 %
INR PPP: 1 (ref 0.8–1.2)
LYMPHOCYTES # BLD AUTO: 1 X10E3/UL (ref 0.7–3.1)
LYMPHOCYTES NFR BLD AUTO: 15 %
MCH RBC QN AUTO: 28.5 PG (ref 26.6–33)
MCHC RBC AUTO-ENTMCNC: 32.6 G/DL (ref 31.5–35.7)
MCV RBC AUTO: 87 FL (ref 79–97)
MONOCYTES # BLD AUTO: 0.5 X10E3/UL (ref 0.1–0.9)
MONOCYTES NFR BLD AUTO: 8 %
NEUTROPHILS # BLD AUTO: 4.6 X10E3/UL (ref 1.4–7)
NEUTROPHILS NFR BLD AUTO: 73 %
PLATELET # BLD AUTO: 304 X10E3/UL (ref 150–379)
POTASSIUM SERPL-SCNC: 5.8 MMOL/L (ref 3.5–5.2)
PROT SERPL-MCNC: 7.4 G/DL (ref 6–8.5)
PROTHROMBIN TIME: 10.2 SEC (ref 9.1–12)
RBC # BLD AUTO: 4.94 X10E6/UL (ref 3.77–5.28)
SODIUM SERPL-SCNC: 142 MMOL/L (ref 134–144)
WBC # BLD AUTO: 6.4 X10E3/UL (ref 3.4–10.8)

## 2019-06-27 ENCOUNTER — HOSPITAL ENCOUNTER (OUTPATIENT)
Dept: LAB | Age: 64
Discharge: HOME | End: 2019-06-27

## 2019-06-27 ENCOUNTER — OFFICE VISIT (OUTPATIENT)
Dept: HEMATOLOGY | Age: 64
End: 2019-06-27

## 2019-06-27 VITALS
SYSTOLIC BLOOD PRESSURE: 114 MMHG | TEMPERATURE: 97.7 F | OXYGEN SATURATION: 90 % | WEIGHT: 103 LBS | HEIGHT: 63 IN | BODY MASS INDEX: 18.25 KG/M2 | HEART RATE: 81 BPM | DIASTOLIC BLOOD PRESSURE: 81 MMHG

## 2019-06-27 DIAGNOSIS — R74.8 ELEVATED ALKALINE PHOSPHATASE LEVEL: Primary | ICD-10-CM

## 2019-06-27 LAB — XX-LABCORP SPECIMEN COL,LCBCF: NORMAL

## 2019-06-27 PROCEDURE — 99001 SPECIMEN HANDLING PT-LAB: CPT

## 2019-06-27 NOTE — PROGRESS NOTES
[address] [facility], MD, MD Ashley Honeycutt, SHARMILA Blackwell, ACNP-BC     April S Lore, [facility]   ANKITA Flowers NP [address] [facility] [address]    at [address], [address] Stevie Thibodeaux  22.    [phone]    FAX: [address] [place] [address], [address]    [phone]    FAX: [phone]       Patient Care Team:  Wisam Moreira MD as PCP - General (Family Practice)    Problem List  Date Reviewed: 1/2/2019          Codes Class Noted    Fixation hardware in foot ICD-10-CM: Z96.7  ICD-9-CM: V45.89  2/18/2016        Elevated alkaline phosphatase level ICD-10-CM: R74.8  ICD-9-CM: 790.5  4/27/2014        Peroneal tenosynovitis (Chronic) ICD-10-CM: M65.9  ICD-9-CM: 727.09  2/4/2014        Valgus foot deformity, acquired (Chronic) ICD-10-CM: M21.079  ICD-9-CM: 736.79  2/4/2014        HCV antibody positive ICD-10-CM: R76.8  ICD-9-CM: 795.79  1/28/2014    Overview Signed 4/27/2014 10:13 AM by Baldo Mclean MD     HCV RNA undetectable             Chronic back pain ICD-10-CM: M54.9, G89.29  ICD-9-CM: 724.5, 338.29  1/28/2014              Alexander Holland [facility] the [facility] for management of Elevated alkaline phosphatase. The active problem list, all pertinent past medical history, medications, endoscopic studies, radiologic findings and laboratory findings related to the liver disorder were reviewed with the patient. The patient is anti-HCV positive but HCV RNA undetectable. Ultrasound of the liver was performed in 12/2018. This suggests chronic liver disease/cirrhosis.        Assessment of liver fibrosis was performed with shear wave elastography at [facility] in 12/2018. The result was E Mean: 6.52 kPa which correlates with normal to mild fibrosis. The patient notes fatigue, arthralgias, myalgias. The patient has not experienced problems concentrating, swelling of the abdomen, swelling of the lower extremities, hematemesis, hematochezia. The patient has limitations in functional activities secondary to these symptoms. ASSESSMENT AND PLAN:  Elevated liver enzymes  Persistent elevation in alkaline phosphatase of unclear etiology at this time. Liver transaminases are normal.  ALP is normal.  Liver function is normal.  The platelet count is normal.    Serologic testing for causes of chronic liver disease were negative     Will perform imaging of the liver with ultrasound. The need to assess liver fibrosis was discussed. Sheer wave elastography can assess liver fibrosis and determine if a patient has advanced fibrosis or cirrhosis without the need for liver biopsy. Sheer wave elastography is now available at [facility]. If elastrography suggests advanced fibrosis then a liver biopsy should be considered. Elastography can be repeated annually to assess for fibrosis progression and need for treatment of the liver disorder. The most likely cause for persistent elevation in ALP is medications. However sicne the ALP is stable and no other liver chemistries are abnormal no changes to medications needs to be made at this time. Treatment of other medical problems in patients with chronic liver disease  There are no contraindications for the patient to take any medications that are necessary for treatment of other medical issues. The patient does not consume alcohol daily. Normal doses of acetaminophen can be used for pain as needed. Normal doses of acetaminophen as recommended on the label are not hepatotoxic, even in patients with cirrhosis.     Counseling for alcohol in patients with chronic liver disease  The patient was counseled regarding alcohol consumption and the effect of alcohol on chronic liver disease. The patient does not consume any significant amount of alcohol. Osteoporosis  The patient has osteoporosis by DEXA scan from 2014. She is not taking a bisphosphonate. The patient should be started on a bisphosphonate. The elevation in ALP is not a contraindication to this. Vaccinations   Vaccination for viral hepatitis A and B is not needed. The patient has serologic evidence of prior exposure or vaccination with immunity. Routine vaccinations against other bacterial and viral agents can be performed as indicated. Annual flu vaccination should be administered if indicated. ALLERGIES  Allergies   Allergen Reactions    Codeine Itching and Nausea and Vomiting     Patient states she is not allergic to medication, \"it just doesn't agree with me. \"    Morphine Nausea and Vomiting     Patient states she is not allergic to medication, \"it just doesn't agree with me. \"  PATIENT DENIES INTOLERANCE TO MORPHINE    Vicodin [Hydrocodone-Acetaminophen] Nausea and Vomiting     Patient states she is not allergic to medication, \"it just doesn't agree with me. \"       Current Outpatient Medications   Medication Sig    levothyroxine sodium (SYNTHROID PO) Take  by mouth.  fluticasone (FLONASE) 50 mcg/actuation nasal spray 2 Sprays by Both Nostrils route daily.  loratadine (CLARITIN) 10 mg tablet Take 1 Tab by mouth daily.  ibuprofen (MOTRIN) 600 mg tablet Take 1 Tab by mouth every six (6) hours as needed for Pain. No current facility-administered medications for this visit. SYSTEM REVIEW NOT RELATED TO LIVER DISEASE OR REVIEWED ABOVE:  Constitution systems: Negative for fever, chills, weight gain, weight loss. Eyes: Negative for visual changes. ENT: Negative for sore throat, painful swallowing. Respiratory: Negative for cough, hemoptysis, SOB.    Cardiology: Negative for chest pain, palpitations. GI:  Negative for constipation or diarrhea. : Negative for urinary frequency, dysuria, hematuria, nocturia. Skin: Negative for rash. Hematology: Negative for easy bruising, blood clots. Musculo-skelatal: Negative for back pain, muscle pain, weakness. Neurologic: Negative for headaches, dizziness, vertigo, memory problems not related to HE. Psychology: Negative for anxiety, depression. FAMILY HISTORY  The father  of dementia. The mother  of peripheral vascular disease. A sister had HCV and has been treated and achieved SVR. SOCIAL HISTORY:  The patient has never been . The patient has no children. The patient had used tobacco in the past.  The patient stopped using tobacco in 2013. The patient consumes alcohol only rarely. The patient used to work as  and . She is no longer working. PHYSICAL EXAMINATION:  Visit Vitals  /81 (BP 1 Location: Right arm, BP Patient Position: Sitting)   Pulse 81   Temp 97.7 °F (36.5 °C)   Ht 5' 3\" (1.6 m)   Wt 103 lb (46.7 kg)   SpO2 90%   BMI 18.25 kg/m²     General: No acute distress. Eyes: Sclera anicteric. ENT: No oral lesions. Thyroid normal.  Nodes: No adenopathy. Skin: No spider angiomata. No jaundice. No palmar erythema. Respiratory: Lungs clear to auscultation. Cardiovascular: Regular heart rate. No murmurs. No JVD. Abdomen: Soft non-tender. Liver size normal to percussion/palpation. Spleen not palpable. No obvious ascites. Extremities: No edema. No muscle wasting. No gross arthritic changes. Neurologic: Alert and oriented. Cranial nerves grossly intact. No asterixsis.     LABORATORY STUDIES:  Liver [place] [address] Units 2018   WBC 3.4 - 10.8 x10E3/uL 7.6 6.4   ANC 1.4 - 7.0 x10E3/uL 5.8 4.6   HGB 11.1 - 15.9 g/dL 12.9 14.1    - 450 x10E3/uL 368 304   INR 0.8 - 1.2  1.0   AST 0 - 40 IU/L 16 23   ALT 0 - 32 IU/L 7 16 Alk Phos 39 - 117 IU/L 96 147 (H)   Bili, Total 0.0 - 1.2 mg/dL <0.2 0.3   Bili, Direct 0.00 - 0.40 mg/dL 0.07 0.08   Albumin 3.6 - 4.8 g/dL 4.4 4.7   BUN 8 - 27 mg/dL 13 9   Creat 0.57 - 1.00 mg/dL 0.70 0.90   Na 134 - 144 mmol/L 140 142   K 3.5 - 5.2 mmol/L 4.5 5.8 (H)   Cl 96 - 106 mmol/L 100 101   CO2 20 - 29 mmol/L 25 27   Glucose 65 - 99 mg/dL 91 94     SEROLOGIES:  Serologies Latest Ref Rng 2014   Hep A Ab, Total Negative Positive (A)   Hep B Surface Ag Negative Negative   Hep B Core Ab, Total Negative Positive (A)   Hep C Genotype  CANCELED   Ferritin 15 - 150 ng/mL 110   Iron % Saturation 15 - 55 % 21     2014. HCV RNA undetected. 2014. HCV RNA undetectable. 3/2014. HCV RNA undetectable. Serologies Latest Ref Rng & Units 3/28/2017   Ferritin 15 - 150 ng/mL 84   Iron % Saturation 15 - 55 % 15   MARY, IFA  Negative   C-ANCA Neg:<1:20 titer <1:20   P-ANCA Neg:<1:20 titer <1:20   ANCA Neg:<1:20 titer <1:20   ASMCA 0 - 19 Units 9   M2 Ab 0.0 - 20.0 Units 3.7   Alpha-1 antitrypsin level 90 - 200 mg/dL 214 (H)     LIVER HISTOLOGY:  2018. Shear wave elastography performed at [facility]. EkPa was E Range: 4.76-10.10 kPa, E Mean: 6.52 kPa, E Median: 6.50 kPa, E Std: 1.63 kPa. The results suggested a fibrosis level of F1.    ENDOSCOPIC PROCEDURES:  Not available or performed    RADIOLOGY:  2014. Ultrasound of liver. Normal appearing liver. No liver mass lesions. 2018. Ultrasound of liver. Echogenic consistent with chronic liver disease. No liver mass lesions. No dilated bile ducts. No ascites. OTHER TESTIN2014. DEXA bone scan. Osteoporosis    FOLLOW-UP:  All of the issues listed above in the Assessment and Plan were discussed with the patient. All questions were answered. The patient expressed a clear understanding of the above. [address] [address] in 6 months for routine monitoring.      ROMAINE Simpson-[address], [address] [address]  [place], [address]  [phone]

## 2019-06-27 NOTE — PROGRESS NOTES
1. Have you been to the ER, urgent care clinic since your last visit? Hospitalized since your last visit? No    2. Have you seen or consulted any other health care providers outside of the [address] [place] [address] since your last visit? Include any pap smears or colon screening.  No

## 2019-06-28 LAB
ALBUMIN SERPL-MCNC: 4.4 G/DL (ref 3.6–4.8)
ALP SERPL-CCNC: 96 IU/L (ref 39–117)
ALT SERPL-CCNC: 7 IU/L (ref 0–32)
AST SERPL-CCNC: 16 IU/L (ref 0–40)
BASOPHILS # BLD AUTO: 0 X10E3/UL (ref 0–0.2)
BASOPHILS NFR BLD AUTO: 1 %
BILIRUB DIRECT SERPL-MCNC: 0.07 MG/DL (ref 0–0.4)
BILIRUB SERPL-MCNC: <0.2 MG/DL (ref 0–1.2)
BUN SERPL-MCNC: 13 MG/DL (ref 8–27)
BUN/CREAT SERPL: 19 (ref 12–28)
CALCIUM SERPL-MCNC: 10.4 MG/DL (ref 8.7–10.3)
CHLORIDE SERPL-SCNC: 100 MMOL/L (ref 96–106)
CO2 SERPL-SCNC: 25 MMOL/L (ref 20–29)
CREAT SERPL-MCNC: 0.7 MG/DL (ref 0.57–1)
EOSINOPHIL # BLD AUTO: 0.1 X10E3/UL (ref 0–0.4)
EOSINOPHIL NFR BLD AUTO: 1 %
ERYTHROCYTE [DISTWIDTH] IN BLOOD BY AUTOMATED COUNT: 14.8 % (ref 12.3–15.4)
GLUCOSE SERPL-MCNC: 91 MG/DL (ref 65–99)
HCT VFR BLD AUTO: 39.2 % (ref 34–46.6)
HGB BLD-MCNC: 12.9 G/DL (ref 11.1–15.9)
IMM GRANULOCYTES # BLD AUTO: 0 X10E3/UL (ref 0–0.1)
IMM GRANULOCYTES NFR BLD AUTO: 0 %
LYMPHOCYTES # BLD AUTO: 1.1 X10E3/UL (ref 0.7–3.1)
LYMPHOCYTES NFR BLD AUTO: 14 %
MCH RBC QN AUTO: 29.7 PG (ref 26.6–33)
MCHC RBC AUTO-ENTMCNC: 32.9 G/DL (ref 31.5–35.7)
MCV RBC AUTO: 90 FL (ref 79–97)
MONOCYTES # BLD AUTO: 0.6 X10E3/UL (ref 0.1–0.9)
MONOCYTES NFR BLD AUTO: 8 %
NEUTROPHILS # BLD AUTO: 5.8 X10E3/UL (ref 1.4–7)
NEUTROPHILS NFR BLD AUTO: 76 %
PLATELET # BLD AUTO: 368 X10E3/UL (ref 150–450)
POTASSIUM SERPL-SCNC: 4.5 MMOL/L (ref 3.5–5.2)
PROT SERPL-MCNC: 7.3 G/DL (ref 6–8.5)
RBC # BLD AUTO: 4.35 X10E6/UL (ref 3.77–5.28)
SODIUM SERPL-SCNC: 140 MMOL/L (ref 134–144)
WBC # BLD AUTO: 7.6 X10E3/UL (ref 3.4–10.8)